# Patient Record
Sex: MALE | Race: WHITE | NOT HISPANIC OR LATINO | Employment: FULL TIME | URBAN - NONMETROPOLITAN AREA
[De-identification: names, ages, dates, MRNs, and addresses within clinical notes are randomized per-mention and may not be internally consistent; named-entity substitution may affect disease eponyms.]

---

## 2024-06-08 ENCOUNTER — APPOINTMENT (OUTPATIENT)
Dept: GENERAL RADIOLOGY | Facility: HOSPITAL | Age: 74
End: 2024-06-08
Payer: OTHER MISCELLANEOUS

## 2024-06-08 ENCOUNTER — HOSPITAL ENCOUNTER (EMERGENCY)
Facility: HOSPITAL | Age: 74
Discharge: HOME | End: 2024-06-08
Attending: STUDENT IN AN ORGANIZED HEALTH CARE EDUCATION/TRAINING PROGRAM
Payer: OTHER MISCELLANEOUS

## 2024-06-08 VITALS
RESPIRATION RATE: 14 BRPM | HEIGHT: 70 IN | TEMPERATURE: 98.2 F | OXYGEN SATURATION: 95 % | HEART RATE: 74 BPM | BODY MASS INDEX: 33.84 KG/M2 | DIASTOLIC BLOOD PRESSURE: 78 MMHG | SYSTOLIC BLOOD PRESSURE: 136 MMHG | WEIGHT: 236.4 LBS

## 2024-06-08 DIAGNOSIS — M25.522 PAIN IN LEFT ELBOW: Primary | ICD-10-CM

## 2024-06-08 PROCEDURE — 73080 X-RAY EXAM OF ELBOW: CPT

## 2024-06-08 PROCEDURE — 99283 EMERGENCY DEPT VISIT LOW MDM: CPT

## 2024-06-08 RX ORDER — MELOXICAM 7.5 MG/1
7.5 TABLET ORAL DAILY
Qty: 14 TABLET | Refills: 0 | Status: SHIPPED | OUTPATIENT
Start: 2024-06-08 | End: 2024-06-22

## 2024-06-08 NOTE — ED PROVIDER NOTES
"Subjective:  History of Present Illness:    Patient is a 74-year-old male without extremity health history.  Reports that he was getting out of his truck this morning and bumped his elbow.  Shortly thereafter noticed a edematous region to left elbow.  Range of motion is intact.  Distal neurovascularly intact.  Patient reports some mild tenderness with palpation.  There is mostly concerned about lump on the end of elbow.  Denies fever.  Denies localized erythema or drainage.  Denies OTC medication home remedy.  Denies alleviating or exacerbating factors.    Nurses Notes reviewed and agree, including vitals, allergies, social history and prior medical history.     REVIEW OF SYSTEMS: All systems reviewed and not pertinent unless noted.  Review of Systems   Musculoskeletal:  Positive for arthralgias.   All other systems reviewed and are negative.      History reviewed. No pertinent past medical history.    Allergies:    Cinnamon and Penicillins      History reviewed. No pertinent surgical history.      Social History     Socioeconomic History    Marital status: Single   Tobacco Use    Smoking status: Never    Smokeless tobacco: Never   Vaping Use    Vaping status: Never Used   Substance and Sexual Activity    Alcohol use: Never    Drug use: Never         History reviewed. No pertinent family history.    Objective  Physical Exam:  /78 (BP Location: Right arm, Patient Position: Sitting)   Pulse 74   Temp 98.2 °F (36.8 °C) (Oral)   Resp 14   Ht 177.8 cm (70\")   Wt 107 kg (236 lb 6.4 oz)   SpO2 95%   BMI 33.92 kg/m²      Physical Exam  Vitals and nursing note reviewed.   Constitutional:       Appearance: Normal appearance. He is normal weight.   HENT:      Head: Normocephalic and atraumatic.      Nose: Nose normal.      Mouth/Throat:      Mouth: Mucous membranes are moist.      Pharynx: Oropharynx is clear.   Eyes:      Extraocular Movements: Extraocular movements intact.      Conjunctiva/sclera: Conjunctivae " normal.      Pupils: Pupils are equal, round, and reactive to light.   Cardiovascular:      Rate and Rhythm: Normal rate and regular rhythm.   Pulmonary:      Effort: Pulmonary effort is normal.      Breath sounds: Normal breath sounds.   Abdominal:      General: Abdomen is flat. Bowel sounds are normal.      Palpations: Abdomen is soft.   Musculoskeletal:         General: Normal range of motion.      Cervical back: Normal range of motion and neck supple.      Comments: There is a lump noted to left elbow consistent with olecranon bursitis   Skin:     General: Skin is warm and dry.      Capillary Refill: Capillary refill takes less than 2 seconds.   Neurological:      General: No focal deficit present.      Mental Status: He is alert and oriented to person, place, and time. Mental status is at baseline.   Psychiatric:         Mood and Affect: Mood normal.         Behavior: Behavior normal.         Thought Content: Thought content normal.         Judgment: Judgment normal.         Procedures    ED Course:         Lab Results (last 24 hours)       ** No results found for the last 24 hours. **             XR Elbow 3+ View Left    Result Date: 6/8/2024  PROCEDURE: XR ELBOW 3+ VW LEFT-  HISTORY: Left elbow  FINDINGS:  Three views show no evidence of acute fracture or dislocation. Mild degenerative changes are noted. Soft tissue swelling is seen overlying the olecranon. No joint effusion is identified. No foreign body is identified.      Impression: No acute bony abnormality identified.  Soft tissue swelling overlying the olecranon may represent localized soft tissue injury or olecranon bursitis.      This report was signed and finalized on 6/8/2024 9:00 AM by Gume Jaeger MD.          MDM      Initial impression of presenting illness: Patient is a 74-year-old male without extremity health history.  Reports that he was getting out of his truck this morning and bumped his elbow.  Shortly thereafter noticed a edematous  region to left elbow.  Range of motion is intact.  Distal neurovascularly intact.  Patient reports some mild tenderness with palpation.  There is mostly concerned about lump on the end of elbow.  Denies fever.  Denies localized erythema or drainage.  Denies OTC medication home remedy.  Denies alleviating or exacerbating factors.    DDX: includes but is not limited to: Sprain, strain, fracture, bursitis or other    Patient arrives stable with vitals interpreted by myself.     Pertinent features from physical exam: Range of motion is intact in left elbow.  Distal neurovascular intact.  Distal circulation intact.  There is a lump noted to elbow consistent with olecranon bursitis..    Initial diagnostic plan: X-ray of left elbow    Results from initial plan were reviewed and interpreted by me revealing x-ray of the left elbow is without acute fracture    Diagnostic information from other sources: Chart review    Interventions / Re-evaluation: Vital signs stable throughout encounter    Results/clinical rationale were discussed with patient    Consultations/Discussion of results with other physicians: N/A    Disposition plan: Patient is hemodynamically stable nontoxic-appearing appropriate discharge.  Please follow-up with orthopedics.  Follow-up with ER for new or worse symptoms.  -----        Final diagnoses:   Pain in left elbow          Wilfredo Doyle, APRN  06/08/24 0955

## 2024-06-08 NOTE — Clinical Note
[facility] EMERGENCY DEPARTMENT  [address] [address]  Phone: [phone]    Irineo Mariano was seen and treated in our emergency department on 6/8/2024.  He may return to work on 06/09/2024.         Thank you for choosing [facility].    Benjamin Fontana DO

## 2024-06-13 ENCOUNTER — HOSPITAL ENCOUNTER (EMERGENCY)
Facility: HOSPITAL | Age: 74
Discharge: HOME | End: 2024-06-13
Attending: EMERGENCY MEDICINE
Payer: OTHER MISCELLANEOUS

## 2024-06-13 ENCOUNTER — APPOINTMENT (OUTPATIENT)
Dept: GENERAL RADIOLOGY | Facility: HOSPITAL | Age: 74
End: 2024-06-13
Payer: OTHER MISCELLANEOUS

## 2024-06-13 VITALS
BODY MASS INDEX: 33.77 KG/M2 | HEIGHT: 70 IN | RESPIRATION RATE: 16 BRPM | HEART RATE: 77 BPM | SYSTOLIC BLOOD PRESSURE: 135 MMHG | OXYGEN SATURATION: 96 % | DIASTOLIC BLOOD PRESSURE: 77 MMHG | TEMPERATURE: 98.4 F | WEIGHT: 235.89 LBS

## 2024-06-13 DIAGNOSIS — M70.22 OLECRANON BURSITIS OF LEFT ELBOW: Primary | ICD-10-CM

## 2024-06-13 DIAGNOSIS — S59.902A INJURY OF LEFT ELBOW, INITIAL ENCOUNTER: ICD-10-CM

## 2024-06-13 PROCEDURE — 73060 X-RAY EXAM OF HUMERUS: CPT

## 2024-06-13 PROCEDURE — 73090 X-RAY EXAM OF FOREARM: CPT

## 2024-06-13 PROCEDURE — 99283 EMERGENCY DEPT VISIT LOW MDM: CPT

## 2024-06-13 RX ADMIN — DICLOFENAC SODIUM 4 G: 10 GEL TOPICAL at 09:12

## 2024-06-13 NOTE — DISCHARGE INSTRUCTIONS
Rest, ice, elevate the extremity at home.  Wear Ace bandage for compression.  Continue medications as prescribed.  Follow-up with Dr. Barahona, orthopedic specialist, for further outpatient evaluation and definitive care.  Follow-up with occupational medicine for work release/restrictions.  Follow-up with your PCP as needed.  Return to the ER for new or worsening symptoms or acute concerns.

## 2024-06-13 NOTE — Clinical Note
[facility] EMERGENCY DEPARTMENT  [address] [address]  Phone: [phone]    Irineo Mariano was seen and treated in our emergency department on 6/13/2024.  He may return to work on 06/17/2024.         Thank you for choosing [facility].    Graham Gonzalez MD

## 2024-06-13 NOTE — ED PROVIDER NOTES
"Subjective:  Chief Complaint:  Arm pain    History of Present Illness:  Patient is a 74-year-old male presenting to the ER with complaints of swelling and pain to his left arm.  Patient was seen here 5 days ago and diagnosed with olecranon bursitis after he hit his arm against something.  He states he hit it very hard and states that it did seem to get better with rest and compression as he was given a sleeve to wear.  However, he states that it got much worse when he went back to work.  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  States he is having difficulty with his work.  He states he was given 1 day off and then had another day off already but states that he had to go back to work after that.  States he cannot afford to stay off for a long. He has not followed up with occupational medicine or any other provider.  Patient also notes that it hurts further down his arm now.      Nurses Notes reviewed and agree, including vitals, allergies, social history and prior medical history.     REVIEW OF SYSTEMS: All systems reviewed and not pertinent unless noted.  Review of Systems   Musculoskeletal:         Left arm pain   All other systems reviewed and are negative.      History reviewed. No pertinent past medical history.    Allergies:    Cinnamon and Penicillins      History reviewed. No pertinent surgical history.      Social History     Socioeconomic History    Marital status: Single   Tobacco Use    Smoking status: Never    Smokeless tobacco: Never   Vaping Use    Vaping status: Never Used   Substance and Sexual Activity    Alcohol use: Never    Drug use: Never         History reviewed. No pertinent family history.    Objective  Physical Exam:  /77 (BP Location: Right arm, Patient Position: Sitting)   Pulse 77   Temp 98.4 °F (36.9 °C) (Oral)   Resp 16   Ht 177.8 cm (70\")   Wt 107 kg (235 lb 14.3 oz)   SpO2 96%   BMI 33.85 kg/m²      Physical Exam  Vitals " and nursing note reviewed.   Constitutional:       General: He is not in acute distress.     Appearance: He is not toxic-appearing.   HENT:      Head: Normocephalic and atraumatic.      Right Ear: External ear normal.      Left Ear: External ear normal.      Nose: Nose normal.   Eyes:      Extraocular Movements: Extraocular movements intact.      Conjunctiva/sclera: Conjunctivae normal.   Cardiovascular:      Rate and Rhythm: Normal rate.   Pulmonary:      Effort: Pulmonary effort is normal. No respiratory distress.   Abdominal:      General: There is no distension.   Musculoskeletal:         General: Swelling and tenderness present.      Cervical back: Normal range of motion and neck supple.      Comments: LUE: 2+ pulses, swelling noted to olecranon process, sensation intact, cap refill <2 secs   Skin:     General: Skin is warm and dry.   Neurological:      General: No focal deficit present.      Mental Status: He is alert and oriented to person, place, and time.   Psychiatric:         Mood and Affect: Mood normal.         Behavior: Behavior normal.         Procedures    ED Course:         Lab Results (last 24 hours)       ** No results found for the last 24 hours. **             XR Forearm 2 View Left    Result Date: 6/13/2024  LEFT FOREARM SERIES  HISTORY: Left arm pain.  FINDINGS: Two views of the left forearm show no evidence of an acute, displaced fracture or dislocation of the visualized bony architecture. There is been side plate and screw fixation of the distal left radius. The joint spaces appear normal. No soft tissue abnormality is seen.      Impression: No acute osseous abnormality.      Images were reviewed, interpreted, and dictated by Dr. Annabelle Patel MD Transcribed by Ronn Luu PA-C.  This report was signed and finalized on 6/13/2024 9:29 AM by Annabelle Patel MD.      XR Humerus Left    Result Date: 6/13/2024  LEFT HUMERUS SERIES  HISTORY: Left arm pain.  FINDINGS: Two views of the left humerus  show no evidence of an acute, displaced fracture or dislocation of the visualized bony architecture. The joint spaces appear normal. No soft tissue abnormality is seen.      Impression: No acute osseous abnormality.      Images were reviewed, interpreted, and dictated by Dr. Annabelle Patel MD Transcribed by Ronn Luu PA-C.  This report was signed and finalized on 6/13/2024 9:28 AM by Annabelle Patel MD.          MDM  Patient evaluated in the ER for left arm pain after hitting it against something at work 5 days ago.  Patient is hemodynamically stable, afebrile, nontoxic-appearing on exam.  Left upper extremity is neurovascularly intact there is what appears to be an olecranon bursitis with tenderness palpation and pain with active and passive range of motion.  Patient has noted some improvement with compression, rest, elevation but had to go back to work and symptoms worsened after he returned to work.  Has not followed up with occupational med or PCP.  Differential diagnosis includes but is not limited to olecranon bursitis, fracture, arthritis, among others.  Initial plan includes x-ray of forearm and humerus to ensure no missed fractures as patient does note some pain below and above the elbow.  Based on exam, most likely olecranon bursitis.  If x-rays are normal, plan to give referral to occupational med as well as orthopedic specialist and give patient a work excuse to allow time to heal.    X-rays are without acute abnormalities per radiology.  Patient agreeable with plan for discharge.  He was given a work excuse.  Recommended that he follow-up with occupational medicine for long-term work release and restrictions.  Patient given an Ace bandage for compression.  RICE therapy recommended.  Provided referral for Dr. Barahona, orthopedic specialist, for further outpatient evaluation and definitive care of elbow injury.  Precautions were given for return to the ER for any new or worsening symptoms.    Final  diagnoses:   Olecranon bursitis of left elbow   Injury of left elbow, initial encounter          Hilaria Yung PA-C  06/13/24 0960

## 2024-06-19 ENCOUNTER — HOSPITAL ENCOUNTER (EMERGENCY)
Facility: HOSPITAL | Age: 74
Discharge: HOME | End: 2024-06-19
Attending: STUDENT IN AN ORGANIZED HEALTH CARE EDUCATION/TRAINING PROGRAM
Payer: OTHER MISCELLANEOUS

## 2024-06-19 VITALS
OXYGEN SATURATION: 97 % | HEIGHT: 70 IN | BODY MASS INDEX: 33.64 KG/M2 | RESPIRATION RATE: 18 BRPM | SYSTOLIC BLOOD PRESSURE: 172 MMHG | HEART RATE: 71 BPM | DIASTOLIC BLOOD PRESSURE: 104 MMHG | TEMPERATURE: 97.7 F | WEIGHT: 235 LBS

## 2024-06-19 DIAGNOSIS — M70.22 OLECRANON BURSITIS OF LEFT ELBOW: Primary | ICD-10-CM

## 2024-06-19 PROCEDURE — 99282 EMERGENCY DEPT VISIT SF MDM: CPT

## 2024-06-19 NOTE — ED PROVIDER NOTES
EMERGENCY DEPARTMENT ENCOUNTER    Pt Name: Irineo Mariano  MRN: [id number]  Pt :   1950  Room Number:  21SF/21  Date of encounter:  2024  PCP: Provider, No Known  ED Provider: Drew Duong PA-C    Historian: Patient, nursing      HPI:  Chief Complaint: Left elbow swelling        Context: Irineo Mariano is a 74 y.o. male who presents to the ED c/o swelling of his left elbow which has been going on for approximately 1 week.  Patient states he is already been seen twice in the emergency department regarding this and had extensive workup including imaging.  However patient states he has been unable to follow-up with orthopedic surgery yet due to ongoing Worker's Compensation complications.  Patient states he only wants a work note today and does not want further workup such as x-rays he just needs to be off work until he can follow-up with orthopedics.  Patient denies any fever or chills, redness, numbness or tingling, or any other complaint.      PAST MEDICAL HISTORY  History reviewed. No pertinent past medical history.      PAST SURGICAL HISTORY  History reviewed. No pertinent surgical history.      FAMILY HISTORY  History reviewed. No pertinent family history.      SOCIAL HISTORY  Social History     Socioeconomic History    Marital status: Single   Tobacco Use    Smoking status: Never    Smokeless tobacco: Never   Vaping Use    Vaping status: Never Used   Substance and Sexual Activity    Alcohol use: Never    Drug use: Never         ALLERGIES  Cinnamon and Penicillins        REVIEW OF SYSTEMS  Review of Systems   Constitutional:  Negative for chills and fever.   HENT:  Negative for congestion and sore throat.    Respiratory:  Negative for cough and shortness of breath.    Cardiovascular:  Negative for chest pain.   Gastrointestinal:  Negative for abdominal pain, nausea and vomiting.   Genitourinary:  Negative for dysuria.   Musculoskeletal:  Negative for back pain.        Left elbow swelling   Skin:   Negative for wound.   Neurological:  Negative for dizziness and headaches.   Psychiatric/Behavioral:  Negative for confusion.    All other systems reviewed and are negative.         All systems reviewed and negative except for those discussed in HPI.       PHYSICAL EXAM    I have reviewed the triage vital signs and nursing notes.    ED Triage Vitals [06/19/24 1202]   Temp Heart Rate Resp BP SpO2   97.7 °F (36.5 °C) 71 18 (!) 172/104 97 %      Temp src Heart Rate Source Patient Position BP Location FiO2 (%)   Oral Monitor Sitting Left arm --       Physical Exam  Vitals and nursing note reviewed.   Constitutional:       General: He is not in acute distress.     Appearance: Normal appearance. He is not ill-appearing, toxic-appearing or diaphoretic.   HENT:      Head: Normocephalic and atraumatic.      Right Ear: External ear normal.      Left Ear: External ear normal.      Nose: No congestion or rhinorrhea.      Mouth/Throat:      Mouth: Mucous membranes are moist.      Pharynx: Oropharynx is clear.   Eyes:      Conjunctiva/sclera: Conjunctivae normal.   Cardiovascular:      Rate and Rhythm: Normal rate.      Heart sounds: Normal heart sounds.   Pulmonary:      Effort: Pulmonary effort is normal. No respiratory distress.      Breath sounds: Normal breath sounds. No wheezing.   Abdominal:      Tenderness: There is no abdominal tenderness.   Musculoskeletal:      Left elbow: Swelling present. No deformity. Normal range of motion. Tenderness present in olecranon process.      Cervical back: Normal range of motion and neck supple.      Right lower leg: No edema.      Left lower leg: No edema.   Skin:     Findings: No rash.   Neurological:      Mental Status: He is alert.             LAB RESULTS  No results found for this or any previous visit (from the past 24 hour(s)).    If labs were ordered, I independently reviewed the results and considered them in treating the patient.        RADIOLOGY  No Radiology Exams Resulted  Within Past 24 Hours    None      PROCEDURES    Procedures    No orders to display       MEDICATIONS GIVEN IN ER    Medications - No data to display      MEDICAL DECISION MAKING, PROGRESS, and CONSULTS    All labs, if obtained, have been independently reviewed by me.  All radiology studies, if obtained, have been reviewed by me and the radiologist dictating the report.  All EKG's, if obtained, have been independently viewed and interpreted by me/my attending physician.      Discussion below represents my analysis of pertinent findings related to patient's condition, differential diagnosis, treatment plan and final disposition.  74-year-old male presenting the ER for evaluation of continuing left elbow swelling.  Patient states his symptoms are exacerbated by work and he was requesting a work note  To give him off work until he can follow-up with orthopedic surgery.  Patient refused any other workup today as he is already had 2 ER visits for same he only wants a work note.  His vital signs as interpreted by me are stable he is afebrile in no acute distress sitting upright alert and oriented his left upper extremity exhibits some swelling over the olecranon process of the left elbow but no redness or warmth, 2+ radial pulse in the left upper extremity is neurovascularly intact.  I consulted with ED attending Dr. Fontana who agreed with the plan of sending the patient home with a work note and following up with his already established orthopedic surgery group.  Patient was in understanding of and agreement with today's plan of care                     Differential diagnosis:    Differential diagnosis included but was not limited to olecranon bursitis, peripheral edema, among other      Additional sources:    - Discussed/ obtained information from independent historians: None    - External (non-ED) record review: Recent emergency department visit records from 6/13/2024    - Chronic or social conditions impacting care:  None    Orders placed during this visit:  No orders of the defined types were placed in this encounter.        Additional orders considered but not ordered: I considered ordering an x-ray of the left upper extremity along with blood work but patient refused all further testing      ED Course:    Consultants: ED attending Dr. Fontana                Shared Decision Making:  After my consideration of clinical presentation and any laboratory/radiology studies obtained, I discussed the findings with the patient/patient representative who is in agreement with the treatment plan and the final disposition.   Risks and benefits of discharge and/or observation/admission were discussed.       AS OF 12:49 EDT VITALS:    BP - (!) 172/104  HR - 71  TEMP - 97.7 °F (36.5 °C) (Oral)  O2 SATS - 97%                  DIAGNOSIS  Final diagnoses:   Olecranon bursitis of left elbow         DISPOSITION  Discharge home      Please note that portions of this document were completed with voice recognition software.      Drew Duong PA-C  06/19/24 4691

## 2024-06-19 NOTE — DISCHARGE INSTRUCTIONS
Please follow-up with orthopedic surgery soon as possible.  Continue to wear your compression stocking, alternate Tylenol and ibuprofen every 6-8 hours for pain and return to the ER if you develop any fever, chills, redness, or worsening swelling of your left upper extremity.

## 2024-06-19 NOTE — Clinical Note
[facility] EMERGENCY DEPARTMENT  [address] [address]  Phone: [phone]    Irineo Mariano was seen and treated in our emergency department on 6/19/2024.  He may return to work on 06/26/2024.         Thank you for choosing [facility].    Drew Duong PA-C